# Patient Record
Sex: MALE | Race: BLACK OR AFRICAN AMERICAN | NOT HISPANIC OR LATINO | URBAN - METROPOLITAN AREA
[De-identification: names, ages, dates, MRNs, and addresses within clinical notes are randomized per-mention and may not be internally consistent; named-entity substitution may affect disease eponyms.]

---
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
## 2024-03-22 ENCOUNTER — HOSPITAL ENCOUNTER (EMERGENCY)
Facility: HOSPITAL | Age: 19
Discharge: HOME | End: 2024-03-22
Attending: EMERGENCY MEDICINE
Payer: MEDICAID

## 2024-03-22 VITALS
WEIGHT: 165 LBS | HEART RATE: 68 BPM | OXYGEN SATURATION: 100 % | HEIGHT: 74 IN | SYSTOLIC BLOOD PRESSURE: 135 MMHG | RESPIRATION RATE: 18 BRPM | DIASTOLIC BLOOD PRESSURE: 70 MMHG | TEMPERATURE: 98 F | BODY MASS INDEX: 21.17 KG/M2

## 2024-03-22 DIAGNOSIS — F41.9 ANXIETY: Primary | ICD-10-CM

## 2024-03-22 LAB
ALBUMIN SERPL BCP-MCNC: 4.4 G/DL (ref 3.5–5.2)
ALP SERPL-CCNC: 80 U/L (ref 55–135)
ALT SERPL W/O P-5'-P-CCNC: 18 U/L (ref 10–44)
AMPHET+METHAMPHET UR QL: NEGATIVE
ANION GAP SERPL CALC-SCNC: 9 MMOL/L (ref 8–16)
APAP SERPL-MCNC: <3 UG/ML (ref 10–20)
AST SERPL-CCNC: 19 U/L (ref 10–40)
BARBITURATES UR QL SCN>200 NG/ML: NEGATIVE
BASOPHILS # BLD AUTO: 0.03 K/UL (ref 0–0.2)
BASOPHILS NFR BLD: 0.7 % (ref 0–1.9)
BENZODIAZ UR QL SCN>200 NG/ML: NEGATIVE
BILIRUB SERPL-MCNC: 0.7 MG/DL (ref 0.1–1)
BILIRUB UR QL STRIP: NEGATIVE
BUN SERPL-MCNC: 8 MG/DL (ref 6–20)
BZE UR QL SCN: NEGATIVE
CALCIUM SERPL-MCNC: 9.6 MG/DL (ref 8.7–10.5)
CANNABINOIDS UR QL SCN: NEGATIVE
CHLORIDE SERPL-SCNC: 105 MMOL/L (ref 95–110)
CLARITY UR REFRACT.AUTO: CLEAR
CO2 SERPL-SCNC: 26 MMOL/L (ref 23–29)
COLOR UR AUTO: YELLOW
CREAT SERPL-MCNC: 0.9 MG/DL (ref 0.5–1.4)
CREAT UR-MCNC: 212 MG/DL (ref 23–375)
DIFFERENTIAL METHOD BLD: ABNORMAL
EOSINOPHIL # BLD AUTO: 0.1 K/UL (ref 0–0.5)
EOSINOPHIL NFR BLD: 1.1 % (ref 0–8)
ERYTHROCYTE [DISTWIDTH] IN BLOOD BY AUTOMATED COUNT: 14.3 % (ref 11.5–14.5)
EST. GFR  (NO RACE VARIABLE): >60 ML/MIN/1.73 M^2
ETHANOL SERPL-MCNC: <10 MG/DL
GLUCOSE SERPL-MCNC: 92 MG/DL (ref 70–110)
GLUCOSE UR QL STRIP: NEGATIVE
HCT VFR BLD AUTO: 44 % (ref 40–54)
HCV AB SERPL QL IA: NORMAL
HGB BLD-MCNC: 13.8 G/DL (ref 14–18)
HGB UR QL STRIP: NEGATIVE
HIV 1+2 AB+HIV1 P24 AG SERPL QL IA: NORMAL
IMM GRANULOCYTES # BLD AUTO: 0 K/UL (ref 0–0.04)
IMM GRANULOCYTES NFR BLD AUTO: 0 % (ref 0–0.5)
KETONES UR QL STRIP: NEGATIVE
LEUKOCYTE ESTERASE UR QL STRIP: NEGATIVE
LYMPHOCYTES # BLD AUTO: 1.7 K/UL (ref 1–4.8)
LYMPHOCYTES NFR BLD: 36.8 % (ref 18–48)
MCH RBC QN AUTO: 23.9 PG (ref 27–31)
MCHC RBC AUTO-ENTMCNC: 31.4 G/DL (ref 32–36)
MCV RBC AUTO: 76 FL (ref 82–98)
METHADONE UR QL SCN>300 NG/ML: NEGATIVE
MONOCYTES # BLD AUTO: 0.5 K/UL (ref 0.3–1)
MONOCYTES NFR BLD: 11.1 % (ref 4–15)
NEUTROPHILS # BLD AUTO: 2.3 K/UL (ref 1.8–7.7)
NEUTROPHILS NFR BLD: 50.3 % (ref 38–73)
NITRITE UR QL STRIP: NEGATIVE
NRBC BLD-RTO: 0 /100 WBC
OPIATES UR QL SCN: NEGATIVE
PCP UR QL SCN>25 NG/ML: NEGATIVE
PH UR STRIP: 7 [PH] (ref 5–8)
PLATELET # BLD AUTO: 265 K/UL (ref 150–450)
PMV BLD AUTO: 9.5 FL (ref 9.2–12.9)
POTASSIUM SERPL-SCNC: 3.9 MMOL/L (ref 3.5–5.1)
PROT SERPL-MCNC: 7.5 G/DL (ref 6–8.4)
PROT UR QL STRIP: NEGATIVE
RBC # BLD AUTO: 5.77 M/UL (ref 4.6–6.2)
SODIUM SERPL-SCNC: 140 MMOL/L (ref 136–145)
SP GR UR STRIP: 1.02 (ref 1–1.03)
TOXICOLOGY INFORMATION: NORMAL
TSH SERPL DL<=0.005 MIU/L-ACNC: 0.92 UIU/ML (ref 0.4–4)
URN SPEC COLLECT METH UR: NORMAL
WBC # BLD AUTO: 4.51 K/UL (ref 3.9–12.7)

## 2024-03-22 PROCEDURE — 80307 DRUG TEST PRSMV CHEM ANLYZR: CPT | Performed by: EMERGENCY MEDICINE

## 2024-03-22 PROCEDURE — 99283 EMERGENCY DEPT VISIT LOW MDM: CPT

## 2024-03-22 PROCEDURE — 80143 DRUG ASSAY ACETAMINOPHEN: CPT | Performed by: EMERGENCY MEDICINE

## 2024-03-22 PROCEDURE — 84443 ASSAY THYROID STIM HORMONE: CPT | Performed by: EMERGENCY MEDICINE

## 2024-03-22 PROCEDURE — 80053 COMPREHEN METABOLIC PANEL: CPT | Performed by: EMERGENCY MEDICINE

## 2024-03-22 PROCEDURE — 81003 URINALYSIS AUTO W/O SCOPE: CPT | Mod: 59 | Performed by: EMERGENCY MEDICINE

## 2024-03-22 PROCEDURE — 87389 HIV-1 AG W/HIV-1&-2 AB AG IA: CPT | Performed by: PHYSICIAN ASSISTANT

## 2024-03-22 PROCEDURE — 85025 COMPLETE CBC W/AUTO DIFF WBC: CPT | Performed by: EMERGENCY MEDICINE

## 2024-03-22 PROCEDURE — 86803 HEPATITIS C AB TEST: CPT | Performed by: PHYSICIAN ASSISTANT

## 2024-03-22 PROCEDURE — 82077 ASSAY SPEC XCP UR&BREATH IA: CPT | Performed by: EMERGENCY MEDICINE

## 2024-03-22 NOTE — CONSULTS
"Emergency Psychiatry Consult Note    3/22/2024 6:09 PM  David Crews  MRN: [id number]    Chief Complaint / Reason for Consult: anxiety     SUBJECTIVE     History of Present Illness:   David Crews is a 18 y.o. male with a past psychiatric history of unspecified depression and anxiety, currently presenting with anxiety. Emergency Psychiatry was originally consulted to address the patient's symptoms of anxiety.    Per ED RN(s):  Patient is a 18-year-old male presents to the ED via personal vehicle with c/o mental health issues. Patient states he is a little anxious and having rapid thoughts. Denies SI or homicidal ideations. Denies visual or auditory hallucinations. Patient state he "wants to get his mental health right"     Per Psychiatry:  Upon initiation of interview, pt was sitting up in bed comfortably. Calm and cooperative throughout interview, with full, reactive affect and normal speech, though he does at times have bizarre thought content. Says he came into ED because he has been feeling anxious for the past few days. 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 He says that on a scale from 1 to 10, his anxiety, is currently a zero. He says it was a 4.5 prior to coming into the hospital, and that he mainly came in because he was having rapid thoughts and he wanted resources for his anxiety, specifically stating he would like talk therapy. When asked about the content of these rapid thoughts, he states, "I don't remember, but it was nothing bad."  He denies any thoughts of suicide or self harm. Denies any issues with sleep, appetite, concentration or fatigue. No feelings of depression " "or feeling hopeless. Says he is overall happy right now and his mood is "good." He says "he is a people person," and enjoys going to the mall, and being around people. Denies any recent drug or alcohol use. Denies any past psychiatric diagnoses and has never been on psychiatric medications or seen an outpatient psychiatrist. Denies any history or current symptoms of ana maria or psychosis, other than his reports of intermittent rapid thoughts. But he otherwise is able to converse coherently, with linear and goal directed thoughts, without RIS or distractibility.  Does report a suicide attempt in 2020 in which he "tried to hang himself because his mother took away his video games." He otherwise denies any other suicide attempts or thoughts of self harm or self injurious behavior. He denies HI, AVH and paranoia. Reports he lives with his mother, two sister and grandmother and has good support from them.    Psychiatric Review of Systems:  sleep: no  appetite: no  weight: no  energy/anergy: no  interest/pleasure/anhedonia: no  anxiety/panic: yes  guilty/hopelessness: no  concentration: no  S.I.B.s/risky behavior: no  any drugs: no  alcohol: no     Medical Review Of Systems:  Pertinent items noted in HPI    Psychiatric History:  Diagnose(s): no formal diagnoses, though documentation of depression and anxiety in past ED visits  Previous Medication Trials: No  Previous Psychiatric Hospitalizations: No  Family Psychiatric History: unknown  Outpatient Psychiatrist: No  Outpatient Therapist: No    Suicide/Violence Risk Assessment:  Current/active suicidal ideation/plan/intent: No  Previous suicide attempts: Yes - reports he tried to hang self in 2020 because his mother took a video game away from him  Current/active homicidal ideation/plan/intent: No  History of threats/arrests associated with violent conduct - No  Access to firearms/lethal weapons - Yes - says his mother    Social History:  Marital Status: single  Children: 0 " "  Employment Status:  unemployed, recently worked at [employer] but left   Education: high school diploma/GED  Special Ed:  denies   Housing Status: Yes - lives with biological mother, older sister, younger sister and grandmother   Developmental History: Denies   History of Abuse: Denies     Substance Abuse History:  Recreational Drugs: Reports history of THC use, last smoked in February   Use of Alcohol: denied  Rehab History: No  Tobacco Use: No    Legal History:  Past Charges/Incarcerations: No  Pending Charges: No    Psychosocial Factors:  Stressors: occupational.   Functioning Relationships: good support system    Collateral:   Mother - Rajwinder Jordan - attempted to call multiple times and left message, but unable to connect    Scheduled Meds:    Psychotherapeutics (From admission, onward)      None          PRN Meds:    Home Meds:  Prior to Admission medications    Not on File     Psychotherapeutics (From admission, onward)      None          Allergies:  Penicillins  Past Medical/Surgical History:  Past Medical History:   Diagnosis Date    Amblyopia      No past surgical history on file.  OBJECTIVE     Vital Signs:  Temp:  [98.3 °F (36.8 °C)]   Pulse:  [79]   Resp:  [18]   BP: (137)/(79)   SpO2:  [100 %]     Mental Status Exam:  Appearance: unremarkable, age appropriate  Level of Consciousness: alert and oriented   Behavior/Cooperation: normal, cooperative, friendly and cooperative  Psychomotor: unremarkable   Speech: normal tone, normal rate, normal pitch, normal volume  Language: english, fluid  Orientation: grossly intact  Attention Span/Concentration: spelled "WORLD" forwards and backwards  Memory: Grossly intact  Mood: "good"  Affect: normal  Thought Process: linear, normal and logical  Associations: normal and logical  Thought Content: normal, no suicidality, no homicidality, delusions, or paranoia, though with some bizarre thought content at times   Fund of Knowledge: Aware of current events  Abstraction: " "proverbs were abstract, similarities were abstract  Insight: good  Judgment: good    Laboratory Data:  No results found for this or any previous visit (from the past 48 hour(s)).   No results found for: "PHENYTOIN", "PHENOBARB", "VALPROATE", "CBMZ"  Imaging:  Imaging Results    None          ASSESSMENT     David Crews is a 18 y.o. male with a past psychiatric history of unspecified depression and anxiety, currently presenting with anxiety. Emergency Psychiatry was originally consulted to address the patient's symptoms of anxiety. Patient reports increase in intermittent anxiety over the past few days with associated palpitations and racing thoughts. On psych evaluation, he denies any current anxiety, and states he would just like resources for talk therapy. He denies any recent thoughts of suicide or self harm. No HI, AVH or paranoia. Denies feeling down, depressed or hopeless and does not exhibit any signs of depression to the point of suicidality (able to concentrate, affect full and reactive, speech normal, no psychomotor agitation or retardation). Denies changes in sleep, appetite, concentration or energy level. Other than his mild bizarre thought content and reports of intermittent rapid thoughts, he does not exhibit any overt signs concerning for ana maria or psychosis (able to converse coherently, linear and goal directed thought, no RIS, distractibility or preoccupation). He has demonstrated help seeking behavior and reports good support system at home to where he feels safe returning. As such, do not feel patient meets criteria for PEC. Discussed indication for returning to ED, including severe worsening of symptoms or thoughts of self harm. Provided patient with mental health resources in discharge paperwork and encouraged him to follow up with this.      IMPRESSION  Anxiety, unspecified   R/O Intellectual Disability vs Autism Spectrum Disorder    RECOMMENDATION(S)      1. Scheduled Medication(s):   - none " indicated     2. PRN Medication(s):   - none indicated     3. Legal Status/Precaution(s):  Patient does not meet criteria for PEC or inpatient psychiatric admission at this time. Recommend to rescind PEC if one was placed. Patient is not currently an imminent danger to self or others and is not gravely disabled due to a psychiatric illness. Patient is cleared from a psychiatric standpoint and can be discharged to home/self-care; will sign off. Please provide a resource sheet if needed.    In cases of emergency, daily coverage provided by Acute/ED Psych MD, NP, or SW, with associated contact numbers listed in the [facility] On Call Schedule.    Case discussed with emergency psychiatry staff: Dr. Brittany Skelton MD   [facility], PGY-2

## 2024-03-22 NOTE — ED TRIAGE NOTES
"Patient is a 18-year-old male presents to the ED via personal vehicle with c/o mental health issues. Patient states he is a little anxious and having rapid thoughts. Denies SI or homicidal ideations. Denies visual or auditory hallucinations. Patient state he "wants to get his mental health right"   "

## 2024-03-23 NOTE — DISCHARGE INSTRUCTIONS
REFERRAL RECOMMENDATIONS FOR SUBSTANCE ABUSE & MENTAL HEALTH      IN CASE OF SUICIDAL THINKING, call the National Suicide Hotline Number: 988    988 Suicide & Crisis Lifeline: 988 , 1-317-063-ZXJC (0875)  https://988lifeline.org         MENTAL HEALTH:     [facility]  [phone]    [facility] - General Psychiatry Intensive Outpatient Program  [facility] (formerly known as [facility])  [phone], option 3    [facility] - Dual Diagnosis Intensive Outpatient Program  [facility] (formerly known as [facility])  [phone], option 2      [facility] MENTAL HEALTH CENTERS:     [facility]  ([facility], aka [facility])  Serves [facility].  Serves uninsured patients & those with Medicaid.  Main location: [address] [place] [id number] [phone]  Walk-in's available during regular business hours.  24/7 Crisis Line: [phone]    [facility]  (aka [facility])  Serves [facility].  Serves uninsured patients, those with Medicaid and some private plans.  Walk-in's available during regular business hours.  Primary care services available as well.  [facility]: [address] [place] [address];  [phone]  [place]: [address] [place] [address];  [phone]  24/7 Crisis Line: [phone]    [facility]  Serves uninsured patients & those with Medicaid, call for more info.  Primary care, pediatrics, HIV treatment, and dentistry services available as well.  Three locations.  [phone]    [facility] [place]Corporate Office  Serves patients with Medicaid, Medicare, and private insurance  [address]  [place] [address] [phone]    [facility]  Serves uninsured on a sliding scale, as well as Medicaid, Medicare, and private plans.  Eight locations around the [facility] area.  [phone]    [facility]  Serves uninsured patients & those with Medicaid, private insurances.  Primary care available as well.  [phone]  [address] [place] [address]    [facility] Outpatient [facility]  Serves veterans who were honorably discharged.  [address] [place] [address]  [phone]  24/7 Veterans Crisis Line: 1-137.983.4980 (Press 1)    If you have private insurance and need to find a specialist, please contact your insurance network to request a list of providers covered by your benefits.      MENTAL HEALTH/ADDICTIVE DISORDERS:     AA ([phone]), NA ([phone])   National Suicide Prevention Lifeline- Call 1-662.445.7316 Available 24 hours everyday  [facility] [phone]; Crisis Line 991-1496 - Call for options A-F:  Intensive Outpatient Treatment/ Day programs   [facility], please contact   [facility], please contact [phone] or [phone] to speak with an admissions counselor.  [facility] (Methadone Maintenance)   [address] [place] [address], [phone]  [address] [place] [address] [phone]  [facility], [address] [address], [phone]  [facility] [phone]  [facility] please contact [phone]  [facility], [address], 4th floor [place] [address] Phone: [phone]   [place] Office: [address] [address], [phone]  [place] Office: [address] [facility], [address], [place] [address], [phone]  [place] Office: [address] [place] [address] [phone]    Outpatient Substance Abuse Treatment   Behavioral  Health Group (Methadone Maintenance)   [address] [place] [address], [phone]  [address], [place], [place] [address] [phone]  [facility], [address] [place] [address], [phone]  [place] Office: [address] [place] [address], [phone]  [place] Office: [address] [facility], [address], [place] [address], [phone]  [place] Office: [address] [place], [place] [address] [phone]  [facility], [address] [place], [place] [id number] [phone]   [facility], [address] [facility], [address], [phone]  [facility], [address] [place], [place] [phone]    Residential Substance Abuse Treatment   [facility] [address] [facility], [phone] or [phone]  [facility], [address] [facility], [phone]  [facility] (men only) [address] [place] [address], [phone]  Women at the [facility] (women only) [address] [place] [address] [phone]  [facility], [address] [place] [address] [phone]  [facility] (women only), intakes at [address] [facility], [phone]  [facility] (7-day program, $100, [address], [phone], [phone], [phone])  [facility] (Men only, [phone]), [address], [place] (Vets*/Non-Vets)  [facility] (Men only, $400/month program fee) [address], [phone]  [facility] (Women over age 39 only), [address] [facility], [phone]    Out of Area:    [place], [address] [facility] [address], [place], [place] ([phone])  [facility] (men only), [facility] [facility] [place] [address], [phone]  [facility], [address] [place], [place] ([phone])  [address] [place], [place] ([phone])  [facility], [address] [facility],  [place], [phone]  [facility] (Women only, has to have mental illness, can be homeless or substance abuser), [phone]        DOMESTIC VIOLENCE RESOURCES:     Advocacy  [facility] (NOFJC)  [address] [address]    [facility] ? [phone]  Services provided: emergency shelter, individual advocacy, information and referrals, group support, children's program, medical advocacy, forensic medical exams, primary care, legal assistance, counseling, safety planning, and caregiver support    [facility] HEALING AND EMPOWERMENT [place]  Confidential location  [facility] ? [phone]  Services provided: short term emergency shelter, all services provided are free of charge    [facility] CENTERS FOR COMMUNITY ADVOCACY  Multiple locations in [facility], and [facility] ([place], [place], and [place])    [facility] ? [phone]  Services provided: emergency shelter, individual advocacy, information and referrals, group support, children's program, medical advocacy, legal assistance in obtaining restraining orders, counseling, safety planning, and caregiver support    [facility]   Emergency Shelter   [phone]  Emergency Services ,Legal and Financial Assistance Services ,Housing Services ,Support Services     [facility]   [phone]  Emergency Services ,Counseling Services , Housing Services ,Support Services ,Children's Services     [facility]  [address] [place] [address]  WWAV ? [phone]  Services provided: advocacy, health education and supportive services, specializing in free healing services for marginalized groups, including LGBTQ individuals and sex workers    SEXUAL [facility]UMA [facility]ARENESS [facility] RESPONSE (STAR)  [address] [place] [address]    STAR ? (539) 425STAR  Services provided: individual advocacy, information and referrals, group  support, medical advocacy, legal assistance, counseling, and safety planning for survivors of sexual assault    [facility] ([facility])  [address], [place] [address]  [facility] ? [phone]  Services provided: free forensic medical exams for sexual assault and domestic violence, which can be performed up to 5 days after an incident. It is not necessary to make a police report to receive a forensic medical exam    Legal  [facility]  [address] [facility] [address], [place] [address]  [facility] ? [phone]  Services provided: free emergency legal representation for survivors of doemstic violence residing in [facility]. Legal services may include temporary restraining orders, temporary child support, custody, and use of property    [facility] LEGAL SERVICES (SLLS)  [address] [facility],  [address], [place] [address]  SLLS ? [phone]  Services provided: free legal representation for survivors of domestic violence residing in [facility]. Legal services may include temporary child support, custody, and divorce      HOTLINES:     [facility] DOMESTIC VIOLENCE HOTLINE  [phone]    Services provided: free and confidential hotline for victims and survivors of domestic violence. All calls will be routed to a domestic violence service provided in the victim or survivor's area    NATIONAL HUMAN TRAFFICKING HOTLINE  [phone]    Services provided: national anti-trafficking hotline serving victims and survivors of human trafficking. Provides information about local resources, and access to safe space to report tips, seek services, and ask for help    VIA LINK  211 or [phone]    Service provided: counselors can provide crisis counseling. Counselors can also provide information and referrals to programs which can help with needs such as food, shelter, medical care, financial assistance, mental health services, substance abuse treatment, senior  services, childcare, and more      HOMELESS SHELTERS:      Homeless shelters  [facility]  Emergency shelter for individuals and families  [address]  [phone]  Nelia Alonso  Emergency shelter for men only  Meals daily 6am, 2pm, & 6pm  Clothing, case management M-F by appointment (ID/job/housing/legal assistance), mail  [address] [facility]  [phone]  [facility]  Emergency shelter for men  [address] [facility]  [phone]  Emergency shelter for women  [address] [facility]  [phone]  Breakfast & lunch daily, dinner M-F  Case management, job counseling services   [facility]  Emergency shelter for teens and young adults up to 22yo  [address] [facility]  [phone]  [facility]  Emergency shelter for women over 17yo and their kids  [address] [place] [address]  [phone]  [facility]  Day program, meals M-F 1PM (arrive early)  Showers, laundry, hygiene kits, showers, phones, , notary services, case management, ID assistance  [address] [facility]  [phone] M-F 8am-2:30pm  [facility]  Day program  MTWF 7:30am-3:30pm,  8:30am-3:30pm  Crisis intervention, employment assistance, food/clothing, hygiene kits, bus tokens, mail  [address] [facility] [address]  [phone]  [facility]  Mobile outreach for homeless persons in [facility]  [phone]  Healthcare for the Homeless  Primary healthcare, case management, dental services, TB placement  Call ahead  [address] [facility] 2nd Floor  [phone]  [place] at the [facility]  Connects homeless people with their loved ones in other cities by providing transportation costs   [phone]      [place] RESOURCES:     [place] Mobile Mental Health Crisis Response Team:    Region 12 ([facility], [place], and [facility]) ([facility])  [phone]      Outpatient Mental Health & Addiction Clinic Resources for both [place] and  [facility]:    [facility] Mental Healthcare Resources  Website: www.[facility].org  Main Number: 605-751-0351    [facility])  [address] ([address] [facility]) [place] [address]  [phone]    [facility] ([facility])  [address] ([address] [facility]) [place] [address]  [phone]    [facility]  [address] ([address]) [place] [address]  [phone]    [facility]  [address] ([address] [facility]) [place] [address]  [phone]      Addiction Treatment Resources for both [facility] and [facility]:    [facility] (Detox, Residential, PHP, IOP, and Aftercare Programs)  [address] [place] [address]  [phone]    [facility] (Residential, IOP, Transitional Living, and Aftercare Programs)  [address] [place] [address]  [phone]    [facility] (Inpatient, Residential, Detox, IOP, Outpatient, and Aftercare Programs)  [address] [place] [address]  [phone] or toll free at [phone]      Outpatient Mental Health Psychotherapy Resources for both [facility] and [facility]:    Patience Oconnell, [facility]  [address]  [place] [address]  [phone]  Specialties: Depression, Anxiety, and Life Transitions    Alyssa Moffett, PhD  [address] [facility] [facility] [address]  [address]  [place] [address]  [phone]  Specialties: Testing and Evaluation, Education and Learning Disabilities, and ADHD    Anna Mendez, LCSW [facility] [address] [place] [address]  [phone]  Specialties: Obsessive-Compulsive (OCD), Depression, and Relationship Issues    Alysa Mendoza, [facility] [address] [place] [place], [place] [address] [phone]  Specialties: Trauma & PTSD, Mood Disorders, and  Anxiety    Alysa Berg, PhD, [facility] [address] [place] [address]  [phone]  Specialties: Family Conflict, Child, and Relationship Issues    Barbara Sheth LPC Counsel[facility] [address] [place], [place] [address] [phone]  Specialties: Anxiety, Depression, and Anger Management        IN CASE OF SUICIDAL THINKING, call the National Suicide Hotline Number: 988    988 Suicide & Crisis Lifeline: 988 , 8-380-829-TALK (7165)  Provides 24/7, free and confidential support for people in distress, prevention and crisis resources for you or your loved ones, and best practices for professionals.    Call, text or chat.  https://988Zivity.org

## 2024-03-26 NOTE — ED PROVIDER NOTES
"Encounter Date: 3/22/2024       History     Chief Complaint   Patient presents with    Psychiatric Evaluation     Denies suicidal/homicidal ideation, but having rapid thoughts, want to talk to counselor      17 yo M presents w/ anxiety and describes racing thoughts with a desire to "control my thoughts better". He denies SI/HI. He has had no hallucinations. He denies drug or alcohol use      Review of patient's allergies indicates:   Allergen Reactions    Penicillins Rash     Past Medical History:   Diagnosis Date    Amblyopia      No past surgical history on file.  No family history on file.  Social History     Tobacco Use    Smoking status: Never    Smokeless tobacco: Never   Substance Use Topics    Alcohol use: Not Currently    Drug use: Not Currently     Review of Systems  All other systems reviewed and negative except as noted in HPI    Physical Exam     Initial Vitals [03/22/24 1629]   BP Pulse Resp Temp SpO2   137/79 79 18 98.3 °F (36.8 °C) 100 %      MAP       --         Physical Exam    Nursing note and vitals reviewed.      General: AO x 3, NAD. Well nourished. Well Developed  Head: Normocephalic and Atraumatic  HEENT: PERRLA. EOMI. OP Clear  Neck: Supple, Nontender in midline.  Cardiovascular: RRR. No m/r/g. 2+ Distal Pulses  Pulm/Chest: Chest nontender. Lungs clear to auscultation. No respiratory distress.  Abdomen: Nontender. Nondistended. No rigidity, rebound, or guarding  MSK: extremities atraumatic x 4. Gait normal  Ext: no clubbing, cyanosis, or edema  Neuro: GCS 15. CN II-XII intact. Intact symmetric sensation, strength, DTR x 4 extremities  Skin: no bullae, petechiae, or purpura. Warm, dry, and intact.  Psych: normal mood and affect.      ED Course   Procedures  Labs Reviewed   CBC W/ AUTO DIFFERENTIAL - Abnormal; Notable for the following components:       Result Value    Hemoglobin 13.8 (*)     MCV 76 (*)     MCH 23.9 (*)     MCHC 31.4 (*)     All other components within normal limits "   ACETAMINOPHEN LEVEL - Abnormal; Notable for the following components:    Acetaminophen (Tylenol), Serum <3.0 (*)     All other components within normal limits   HIV 1 / 2 ANTIBODY    Narrative:     Release to patient->Immediate   HEPATITIS C ANTIBODY    Narrative:     Release to patient->Immediate   COMPREHENSIVE METABOLIC PANEL   TSH   URINALYSIS, REFLEX TO URINE CULTURE    Narrative:     Specimen Source->Urine   DRUG SCREEN PANEL, URINE EMERGENCY    Narrative:     Specimen Source->Urine   ALCOHOL,MEDICAL (ETHANOL)          Imaging Results    None          Medications - No data to display  Medical Decision Making  Lab assays reassuring. Patient evaluated by psychiatry at my request to facilitate outpatient f/u. No SI/HI or grave disability. Patient given f/u resources for behavioral health. Strict return precautions and anticipatory guidance given.      Amount and/or Complexity of Data Reviewed  External Data Reviewed: labs, radiology, ECG and notes.     Details: H/o 1 prior visit for similar symptoms  Labs: ordered. Decision-making details documented in ED Course.  Discussion of management or test interpretation with external provider(s): D/w psychiatry who evaluated patient and recommended discharge w/ f/u resources.                                      Clinical Impression:  Final diagnoses:  [F41.9] Anxiety (Primary)          ED Disposition Condition    Discharge           ED Prescriptions    None       Follow-up Information    None          Sessions, Yoan WATERS MD  03/26/24 7891

## 2024-10-07 ENCOUNTER — NURSE TRIAGE (OUTPATIENT)
Dept: ADMINISTRATIVE | Facility: CLINIC | Age: 19
End: 2024-10-07
Payer: MEDICAID

## 2024-10-08 NOTE — TELEPHONE ENCOUNTER
Caller states that he was just follow up on previous contact. Caller is unsure whom he spoke with, caller states that he is feeling better. Caller has no concerns at this time.  Pt advised per protocol and verbalized understanding. info  Reason for Disposition   [1] Follow-up call to recent contact AND [2] information only call, no triage required    Additional Information   Negative: Triager needs further essential information from caller in order to complete triage   Negative: [1] Caller requesting NON-URGENT health information AND [2] PCP's office is the best resource   Negative: Requesting regular office appointment   Negative: Health information question, no triage required and triager able to answer question   Negative: General information question, no triage required and triager able to answer question   Negative: Question about upcoming scheduled surgery, procedure or test, no triage required, and triager able to answer question   Negative: [1] Caller is not with the adult (patient) AND [2] probable NON-URGENT symptoms    Protocols used: Information Only Call - No Triage-A-

## 2025-02-27 ENCOUNTER — HOSPITAL ENCOUNTER (EMERGENCY)
Facility: HOSPITAL | Age: 20
Discharge: HOME | End: 2025-02-27
Attending: EMERGENCY MEDICINE
Payer: MEDICAID

## 2025-02-27 VITALS
RESPIRATION RATE: 18 BRPM | SYSTOLIC BLOOD PRESSURE: 127 MMHG | DIASTOLIC BLOOD PRESSURE: 75 MMHG | TEMPERATURE: 98 F | HEIGHT: 74 IN | WEIGHT: 165 LBS | HEART RATE: 60 BPM | BODY MASS INDEX: 21.17 KG/M2 | OXYGEN SATURATION: 99 %

## 2025-02-27 DIAGNOSIS — F41.9 ANXIETY: Primary | ICD-10-CM

## 2025-02-27 DIAGNOSIS — T50.905A MEDICATION REACTION, INITIAL ENCOUNTER: ICD-10-CM

## 2025-02-27 PROCEDURE — 99281 EMR DPT VST MAYX REQ PHY/QHP: CPT

## 2025-02-27 RX ORDER — HYDROCODONE BITARTRATE AND ACETAMINOPHEN 10; 325 MG/1; MG/1
1 TABLET ORAL
COMMUNITY

## 2025-02-27 RX ORDER — CLINDAMYCIN HYDROCHLORIDE 300 MG/1
300 CAPSULE ORAL EVERY 6 HOURS
COMMUNITY

## 2025-02-27 NOTE — ED TRIAGE NOTES
"David Crews, a 19 y.o. male presents to the ED w/ complaint of feeling funny with anxiety after oral surgery, took extra dose of oxy and abx    Triage note:  Chief Complaint   Patient presents with    Medication Reaction     Pt. Has been on abx and oxy after having his wisdom teeth removed. Pt. States the meds are "making him feel funny."     Review of patient's allergies indicates:   Allergen Reactions    Penicillins Rash           APPEARANCE: awake and alert in NAD. PAIN  0/10  SKIN: warm, dry and intact. No breakdown or bruising.  MUSCULOSKELETAL: Patient moving all extremities spontaneously, no obvious swelling or deformities noted. Ambulates independently.  RESPIRATORY: Denies shortness of breath.Respirations unlabored.   CARDIAC: Denies CP, 2+ distal pulses; no peripheral edema  ABDOMEN: S/ND/NT, Denies nausea  : voids spontaneously, denies difficulty  Neurologic: AAO x 4; follows commands equal strength in all extremities; denies numbness/tingling. Denies dizziness    "

## 2025-02-27 NOTE — ED PROVIDER NOTES
"Encounter Date: 2/27/2025       History     Chief Complaint   Patient presents with    Medication Reaction     Pt. Has been on abx and oxy after having his wisdom teeth removed. Pt. States the meds are "making him feel funny."     19-year-old male with history of anxiety and depression presents to the emergency department with chief complaint of "feeling funny." He recently had his wisdom teeth extracted on February 21st.  States that he was prescribed clindamycin and hydrocodone by his dentist.  Two nights ago, he decided to take an extra dose of hydrocodone see how it made him feel.  He states that he experienced a "bad high" and did not like the way that it made him feel.  He has a hard time articulating his symptoms.  He states that this has been on his mind and he has been feeling guilty about his decision.  He denies SI, HI, AVH.  He did not take the medication to hurt himself.  He states that the clindamycin also makes him feel funny.  He denies any swelling, rash, difficulty breathing, vomiting, sensation of throat closing.  He denies other worsening or alleviating factors.      Review of patient's allergies indicates:   Allergen Reactions    Penicillins Rash     Past Medical History:   Diagnosis Date    Amblyopia      History reviewed. No pertinent surgical history.  No family history on file.  Social History[1]  Review of Systems    Physical Exam     Initial Vitals [02/27/25 0927]   BP Pulse Resp Temp SpO2   (!) 136/7 60 18 98.1 °F (36.7 °C) 99 %      MAP       --         Physical Exam    Vitals reviewed.  Constitutional: He appears well-developed and well-nourished. He is not diaphoretic. No distress.   HENT:   Head: Normocephalic and atraumatic. Mouth/Throat: Oropharynx is clear and moist.   Eyes: EOM are normal. Pupils are equal, round, and reactive to light.   Neck: Neck supple.   Normal range of motion.  Cardiovascular:  Normal rate, regular rhythm, normal heart sounds and intact distal pulses.     " "Exam reveals no gallop and no friction rub.       No murmur heard.  Pulmonary/Chest: Breath sounds normal. He has no wheezes. He has no rhonchi. He has no rales.   Abdominal: Abdomen is soft. Bowel sounds are normal. There is no abdominal tenderness.   Musculoskeletal:         General: Normal range of motion.      Cervical back: Normal range of motion and neck supple.     Neurological: He is alert and oriented to person, place, and time. He has normal strength. GCS score is 15. GCS eye subscore is 4. GCS verbal subscore is 5. GCS motor subscore is 6.   Skin: Skin is warm and dry. Capillary refill takes less than 2 seconds.   Psychiatric: His speech is normal and behavior is normal. Judgment and thought content normal. His mood appears anxious. He expresses no suicidal ideation. He expresses no suicidal plans.         ED Course   Procedures  Labs Reviewed - No data to display         Imaging Results    None          Medications - No data to display  Medical Decision Making  Emergent evaluation of a 19 y.o. male presenting to the emergency department complaining of feeling funny after taking an extra dose of hydrocodone two nights ago. Patient is afebrile, hemodynamically stable, and non toxic appearing.     Differential diagnosis includes but isn't limited to drug overdose, anxiety, depression, guilt.    Patient took an extra dose of hydrocodone 2 nights ago in attempts to get high.  States that it gave him a "bad high" and he did not like the way it made him feel.  The decision to take the extra dose of medication has been on his mind and he has been feeling guilty about this.  He feels well currently and has no complaints.  He did not do this in an attempt to hurt himself or in his life.  He denies SI, HI, AVH.  He states that his pain from his wisdom tooth extraction improved.  I advised him to discontinue use of hydrocodone.  He mentions that the clindamycin also makes him feel funny.  He has no complaints of an " allergic reaction.  Recommend that he touch base with his dentist for further instruction.  His physical exam is reassuring.  I do not feel that emergent workup is indicated.  Stable for discharge with outpatient follow-up.  The patient was instructed to follow up with a primary care provider and dentist or to return to the emergency department for worsening symptoms. The treatment plan was discussed with the patient who demonstrated understanding and comfort with plan.                                       Clinical Impression:  Final diagnoses:  [F41.9] Anxiety (Primary)  [T50.905A] Medication reaction, initial encounter          ED Disposition Condition    Discharge Stable          ED Prescriptions    None       Follow-up Information       Follow up With Specialties Details Why Contact Info    [facility] Emergency Medicine Go to  If symptoms worsen [address] [facility] [id number] [phone]               [1]   Social History  Tobacco Use    Smoking status: Never    Smokeless tobacco: Never   Substance Use Topics    Alcohol use: Not Currently    Drug use: Not Currently        Michelle Campos PA-C  02/27/25 0957

## 2025-02-27 NOTE — DISCHARGE INSTRUCTIONS
Discontinue use of hydrocodone.  Follow up with your primary doctor and dentist or return to the emergency department sooner for any new or worsening symptoms.